# Patient Record
Sex: MALE | Race: WHITE
[De-identification: names, ages, dates, MRNs, and addresses within clinical notes are randomized per-mention and may not be internally consistent; named-entity substitution may affect disease eponyms.]

---

## 2021-12-03 ENCOUNTER — HOSPITAL ENCOUNTER (OUTPATIENT)
Dept: HOSPITAL 35 - GI | Age: 56
Discharge: HOME | End: 2021-12-03
Attending: SPECIALIST
Payer: COMMERCIAL

## 2021-12-03 VITALS — HEIGHT: 69.02 IN | BODY MASS INDEX: 28.14 KG/M2 | WEIGHT: 190 LBS

## 2021-12-03 DIAGNOSIS — Z79.899: ICD-10-CM

## 2021-12-03 DIAGNOSIS — Z98.890: ICD-10-CM

## 2021-12-03 DIAGNOSIS — K64.8: ICD-10-CM

## 2021-12-03 DIAGNOSIS — K62.89: ICD-10-CM

## 2021-12-03 DIAGNOSIS — K52.9: ICD-10-CM

## 2021-12-03 DIAGNOSIS — K92.1: Primary | ICD-10-CM

## 2021-12-03 PROCEDURE — 62110: CPT

## 2021-12-03 PROCEDURE — 62900: CPT

## 2021-12-08 NOTE — PATH
[facility]
[address]
[place]   [address]                     PATHOLOGY RPT PROCEDURE       
_______________________________________________________________________________
 
Name:       STEPHANE TRAORE            Room #:                     REG [facility].#:      [id number]     Account #:      [id number]  
Admission:  12/03/21    Date of Birth:  [birth year]  
Discharge:                                              Report #:    [id number]
                                                        Path Case #: [id number]
_______________________________________________________________________________
 
[facility] Accession Number: [id number]
.                                                                01
Material submitted:                                        .
PART A: colon - ASCENDING COLON BIOPSY. Modifiers: ascending
PART B: colon - TRANSVERSE COLON BIOPSY. Modifiers: transverse
PART C: colon - DESCENDING COLON BIOPSY. Modifiers: descending
PART D: sigmoid colon - SIGMOID COLON BIOPSY
PART E: rectum - RECTUM COLON BIOPSY
.                                                                01
Clinical history:                                          .
COLONOSCOPY
HX OF ULCERATIVE COLITIS
.                                                                02
**********************************************************************
Diagnosis:
A.  Ascending colon, biopsy:
- Colonic mucosa without significant histopathologic abnormality.
- Negative for dysplasia or malignancy.
.
B.  Transverse colon, endoscopic biopsy:
- Colonic mucosa with mild lymphoid aggregate, otherwise no significant
histopathologic abnormality.
- Negative for dysplasia or malignancy.
.
C.  Descending colon, endoscopic biopsy:
- Colonic mucosa with mild chronic nonspecific inflammation.
- Negative for dysplasia or malignancy.
.
D.  Sigmoid colon, biopsy:
- Chronic active colitis, mild in keeping with history of inflammatory
bowel disease.
- Negative for dysplasia or malignancy.
.
E.  Rectum, endoscopic biopsy:
- Chronic active proctitis in keeping with history of ulcerative
colitis/inflammatory bowel disease.
- Negative for dysplasia or malignancy.
.
(ANK:benitez; 12/07/2021)
[facility]  12/07/2021  1046 Local
**********************************************************************
.                                                                02
Electronically signed:                                     .
Dulce Zamora MD, Pathologist
NPI- [id number]
.                                                                01
Gross description:                                         .
 
 
[address]   [address]                     PATHOLOGY RPT PROCEDURE       
_______________________________________________________________________________
 
Name:       STEPHANE TRAORE            Room #:                     REG [facility].#:      [id number]     Account #:      [id number]  
Admission:  12/03/21    Date of Birth:  [birth year]  
Discharge:                                              Report #:    [id number]
                                                        Path Case #: [id number]
_______________________________________________________________________________
A.  Received in formalin labeled "Stephane Traore, ascending colon BX" are
multiple tan-brown soft tissue fragments measuring in aggregate 0.8 x 0.6
x 0.1 cm. The specimen is submitted entirely in A1.
.
B.  Received in formalin labeled "Stephane Traore, transverse colon BX" are
multiple tan-brown soft tissue fragments measuring in aggregate 1.6 x 0.3
x 0.1 cm. The specimen is submitted entirely in B1.
.
C.  Received in formalin labeled "Chencho, Stephane, descending colon BX" are
multiple tan-brown soft tissue fragments measuring in aggregate 0.9 x 0.7
x 0.1 cm. The specimen is submitted entirely in C1.
.
D.  Received in formalin labeled "Chencho, Stephane, sigmoid colon BX" are
multiple tan-brown soft tissue fragments measuring in aggregate 1.0 x 0.5
x 0.1 cm. The specimen is submitted entirely in D1.
.
E. Received in formalin labeled "Chencho, Stephane, rectum colon BX" are
multiple tan-brown soft tissue fragments measuring in aggregate 1.0 x 0.4
x 0.1 cm. The specimen is submitted entirely in E1. ([facility]; 12/4/2021)
[facility]  12/04/2021  1814 Local
.                                                                02
Pathologist provided ICD-10:
K52.9, K62.89
.                                                                02
CPT                                                        .
291718, 309624, 158629, 450045, 152497
Specimen Comment: A courtesy copy of this report has been sent to [phone]
[phone]
Specimen Comment: [id number]
Specimen Comment: Report sent to  / DR MARINO
Specimen Comment: A duplicate report has been generated due to demographic
updates.
***Performed at:  01
   [address], [place], [place]  [id number]
   MD Ziyad Doyle MD Phone:  [phone]
***Performed at:  02
   [address]  [id number]
   MD Sol Holcomb MD Phone:  [phone]

## 2021-12-08 NOTE — P
[facility]
[address]
[place]   [address]                     PROCEDURE REPORT              
_______________________________________________________________________________
 
Name:       STEPHANE TRAORE            Room #:                     REG [facility]#:      [id number]                       Account #:      [id number]  
Admission:  12/03/21    Attend Phys:    Mukesh Davis
Discharge:              Date of Birth:  [birth year]  
                                                          Report #: [id number]
                                                                    [id number] 
_______________________________________________________________________________
THIS REPORT FOR:  
 
cc:  Milvia Roberts MD, Kerry B. MD McElhinney, Christian C. MD                                   ~
 
 
cc:     Milvia Roberts MD
DATE OF SERVICE: 12/03/2021
 
PROCEDURE PERFORMED:  Colonoscopy with biopsies.
 
HISTORY OF PRESENT ILLNESS:  The patient is a 56-year-old male who underwent a 
colonoscopy by myself on 01/14/2015 for diarrhea, frequent bloody stools at that
time.  He was noted to have colitis involving the distal sigmoid colon and 
rectum, most likely consistent with ulcerative colitis.  Biopsies showed chronic
colitis in both areas consistent with ulcerative colitis.  The patient was 
started on Apriso.  He was taking 3 per day.  After 2 months, he quit taking the
medication.  He does report intermittent blood in the stools and diarrhea.  He 
is here for repeat colonoscopy.  No family history of colon cancer.
 
DESCRIPTION OF PROCEDURE:  The risks and benefits of the procedure were 
explained to the patient, those risks including but not limited to bleeding, 
perforation and the risk of sedation.  He understood these risks and gave 
informed consent.  Sedation was given using propofol per Anesthesia.  Next, a 
digital rectal exam was initially performed, which was normal.  Next, using a 
standard Olympus colonoscope, the scope was placed in the patient's anus and 
advanced under direct vision to the cecum.  The overall prep was excellent.  The
cecum and ileocecal valve were normal in appearance.  Terminal ileum was 
intubated and normal in appearance.  Ascending, transverse and descending colon 
were normal.  In the distal sigmoid colon, in the rectum, moderate colitis was 
again noted.  Biopsies were obtained in each segment today.  On retroflexion, 
small nonbleeding internal hemorrhoids were noted.  The scope was then withdrawn
and the procedure terminated.  The patient tolerated the procedure well.
 
IMPRESSION:
1.  Colitis involving the distal sigmoid colon and rectum similar in appearance 
to 2015.
2.  Small internal hemorrhoids.
3.  Otherwise, normal colonoscopy.
 
RECOMMENDATIONS:
1.  Await biopsy results.
2.  Would recommend long-term mesalamine therapy.
3.  Repeat colonoscopy in 2 years.
 
 
 
[address]   [address]                     PROCEDURE REPORT              
_______________________________________________________________________________
 
Name:       BRIANBRITTANYSTEPHANE COPE            Room #:                     REG CL 
[facility]#:      [id number]                       Account #:      [id number]  
Admission:  12/03/21    Attend Phys:    Mukesh Davis
Discharge:              Date of Birth:  [birth year]  
                                                          Report #: [id number]
                                                                    [id number] 
_______________________________________________________________________________
 
 
Thank you for allowing me to participate in his care.
 
 
 
 
 
 
 
 
 
 
 
 
 
 
 
 
 
 
 
 
 
 
 
 
 
 
 
 
 
 
 
 
 
 
 
 
 
 
 
 
 
  <ELECTRONICALLY SIGNED>
   By: Mukesh Mensah MD   
  12/08/21     0817
D: 12/03/21 1001                           _____________________________________
T: 12/03/21 1930                           Mukesh Mensah MD     /nt